# Patient Record
(demographics unavailable — no encounter records)

---

## 2020-06-10 NOTE — HP
PRIMARY CARE PHYSICIAN:  Ishaan Barlow DO



TIME OF ASSESSMENT:  1700 hours.



CHIEF COMPLAINT:  Confusion and memory loss.



HISTORY OF PRESENT ILLNESS:  Mr. Arenas is a 72-year-old gentleman who was brought

into the emergency department by his daughter due to concerns over confusion and

memory loss.  The patient apparently began to show signs of confusion at 10:00 a.m.

He apparently did not recall having a conversation with his brother earlier this

morning and did not recall several conversations.  He had noticed a family member

that he spoke to them at all.  He was not noted to have any speech changes,

extremity weakness or gait disturbances.  The patient denied any complaints

including headache, nausea, or vomiting.  He has been in his normal state of health

in recent days and has been afebrile.  The patient denies any complaints at this

present time.  He is not aware of any time bothering him or having any issues with

his memory. 



EMERGENCY DEPARTMENT COURSE:  In the emergency department, he was noted to have

stable vital signs.  He had an EKG done which demonstrated normal sinus rhythm with

no abnormalities. 



He had a CT of the head done showing mild mucosal thickening of the maxillary

sinuses and ethmoid air cells, otherwise unremarkable. 



He had a CT angiogram of the head and neck, which also showed no acute intracranial

or vascular abnormalities. 



LABORATORY STUDIES:  Done showed a white count of 6.7, hemoglobin 14.3, and

platelets 251.  He was noted to have an elevated creatinine of 1.34 with GFR 52, BUN

of 22, slightly elevated from baseline.  LFTs unremarkable.   and initial

troponin negative.  He had urinalysis done which was completely normal.  Urine drug

screen was negative as well.  The patient is being referred for further observation

and workup. 



PAST MEDICAL HISTORY:  

1. Hyperlipidemia.

2. Hypertension.



PAST SURGICAL HISTORY:  

1. Hernia repair.

2. Tonsillectomy.

3. Vasectomy.



FAMILY HISTORY:  Noncontributory.



SOCIAL HISTORY:  The patient lives with his family.  Reports rare alcohol

consumption.  Denies any tobacco use or illicit drug use. 



ALLERGIES:  NO KNOWN DRUG ALLERGIES.



CURRENT MEDICATIONS:  Atorvastatin.



PHYSICAL EXAMINATION:

GENERAL:  The patient appears well developed, well nourished, and is in no acute

distress. 

VITAL SIGNS:  Temperature 98.2, pulse 86, respirations 18, O2 saturation 96% on room

air, and blood pressure 137/108. 

HEENT:  Normocephalic and atraumatic.  Pupils are equal, round, reactive to light.

Extraocular movements intact.  Oropharynx is clear. 

NECK:  Supple. 

LUNGS:  Clear to auscultation bilaterally without any wheezes, rales, rhonchi. 

CARDIAC:  Regular rate and rhythm without audible murmurs, rubs, or gallops. 

ABDOMEN:  Soft, nontender, nondistended.  Normoactive bowel sounds present.  No

guarding or rigidity.  No renal angle tenderness. 

EXTREMITIES:  No lower leg swelling or edema. 

NEUROLOGIC:  Alert and oriented x3.  Facial movements normal.  Speech normal.  Power

5/5 in all limbs with sensation intact.  Able to follow commands.  No neuro deficits

on exam. 

SKIN:  Warm and dry.



INVESTIGATIONS:  As mentioned above in HPI.



IMPRESSION AND PLAN:  Mr. Arenas is a 72-year-old gentleman presenting with

confusion that started this morning and memory loss, who is being admitted for

management of the following. 

1. Cerebrovascular accident rule out.  Initial CT imaging of the brain unremarkable.

 The patient without any neuro deficits on exam, but still very much having

difficulty recalling events happen this morning.  We will proceed with

cerebrovascular accident workup including an MRI of the brain in the morning and

echo.  Neuro consult ordered.  We will add lipid panel for the morning, check his

TSH and also check the ammonia level.  We will add on magnesium.  Continue to

monitor overnight including neuro checks. 

2. Hypertension.  Monitor blood pressure.  Resume home medications once verified.

3. Hyperlipidemia.  We will continue statin, but we will increase to 40 mg.  He

takes 20 mg at home. 

4. Gastrointestinal prophylaxis with famotidine.

5. Deep venous thrombosis prophylaxis with mechanical SCDs.

6. Code status full.  Surrogate decision maker is his daughter, Aisha Carreno, her

phone number [phone]. 



Case discussed with attending who agrees with plan of care as described above.







Job ID:  [id number]

## 2020-06-10 NOTE — RAD
PA AND LATERAL VIEWS OF THE CHEST:

6/10/20

 

HISTORY: 

Altered mental status. 

 

COMPARISON: 

Comparison made to 8/30/16.

 

FINDINGS: 

The heart size is normal. The aorta is tortuous. Tiny calcified granuloma in the right upper lobe is 
again seen. No lobar consolidation, pneumothoraces, or pleural effusions are identified. Degenerative
 changes of the spine. 

 

IMPRESSION: 

No acute process. 

 

POS: [facility]

## 2020-06-11 NOTE — MRI
Exam: Brain MRI without contrast



HISTORY: Acute confusion, memory problems. Evaluate for CVA.



COMPARISON: None



FINDINGS: 

Calvarial marrow signal intensity: Appropriate T1 signal

Gradient echo sequence: No hemorrhage

Brain parenchyma: No mass, mass effect or midline shift. Brain volume, age-appropriate.

Cortical gray-white matter differentiation: Preserved

Restricted diffusion: Central arterial flow voids are maintained. Absent restricted diffusion

White matter signal intensities:Scattered T2, FLAIR white matter hyperintensities due to chronic smal
l vessel ischemic changes



Sinuses: Mucosal thickening of the paranasal sinuses. Adequate mastoid air cell aeration





IMPRESSION:



1. Scattered chronic small vessel ischemic changes of white matter line

2. Absent restricted diffusion. No evidence of acute infarct.



Reported By: Florencio Rodriguez 

Electronically Signed:  6/11/2020 9:33 AM

## 2020-06-11 NOTE — EEG
*******************************************************************************
********************************************************************************
*****

Referring Physician: AURORA VERDE   

 *******************************************************************************
********************************************************************************
*****

EEG #  

TEST TYPE:  CONTINUOUS EXTENDED VIDEO EEG



REPORT:



This EEG was performed using 24 channel [employer] digital video EEG machine with 24 
disc electrodes.  This was an extended 2 hour 4 minutes of video EEG recording.
  Digital analysis of the EEG was done for spike and seizure detection which 
revealed no abnormalities.



BACKGROUND:  The posterior background rhythm is 9-10 hertz.  The background 
rhythm attenuates with eye opening and enhances with eye closure.

HYPERVENTILATION:  No significant response seen with hyperventilation.

PHOTIC STIMULATION:  Not performed.

SLEEP:  Drowsiness is observed.







EEG DIAGNOSIS:



THIS IS A NORMAL AWAKE AND DROWSY EEG.





Technician: HERMINIA

: EEG.MSL
MTDD

## 2020-06-11 NOTE — CON
NEUROLOGY CONSULTATION



DATE OF CONSULTATION:  06/11/2020



REASON FOR CONSULTATION:  Altered mental status.



HISTORY OF PRESENT ILLNESS:  Mr. Arenas is a 72-year-old male with a history

significant for hypertension, hyperlipidemia, presented with an episode of 
confusion

and memory loss yesterday.  The patient is brought to the emergency department 
by

her daughter, who is also an RN.  The history is taken from the wife since the

patient does not remember most part of the day.  Yesterday, the confusion began

around 10 a.m. when he did not recall the conversation with his brother earlier 
that

morning and the wife noticed that he is confused and does not remember 
conversation

with some of the people he spoke during the day.  She called her daughter who 
is an

RN who watched him for some time and he seems to act confused and so she 
decided to

bring him to the emergency room for further evaluation.  The patient denies any

focal weakness, focal paresthesias, headache, nausea, vomiting, dizziness, 
vertigo,

loss of vision, loss of swallowing, loss of consciousness, chest pain, abdominal

pain, pain associated with the episode.  He was in normal health and denies any

recent illness.  In the emergency room, his vital signs were stable.  Head CT 
was

done, which did not reveal any acute intracranial pathology.  CT angiogram of 
the

head and neck was also unremarkable. 



REVIEW OF SYSTEMS:  All 12 systems were reviewed and were negative except the

pertinent positives and negatives mentioned in the HPI. 



PAST MEDICAL HISTORY:  Hypertension, hyperlipidemia.



PAST SURGICAL HISTORY:  Hernia repair, tonsillectomy, vasectomy.



FAMILY HISTORY:  No family history of stroke or seizures.



SOCIAL HISTORY:  The patient lives with his family, drinks alcohol occasionally.

Denies smoking or illegal drug use. 



ALLERGIES:  NO KNOWN DRUG ALLERGIES.



CURRENT MEDICATIONS:  Atorvastatin.



PHYSICAL EXAMINATION:

VITAL SIGNS:  Blood pressure 130/100, pulse 80, respiratory rate 18. 

CVS:  Regular rate and rhythm. 

CHEST:  Clear. 

ABDOMEN:  Soft. 

NECK:  No carotid bruit. 

NEUROLOGIC:  Mental status, the patient is alert and oriented to person, place, 
and

time.  Speech is normal.  Fund of knowledge is appropriate.  Recent and remote

memory intact.  Cranial nerves 2 through 12 intact.  Motor, muscle tone and 
bulk are

normal.  Strength 5/5 bilaterally.  Sensory intact to all sensory modalities.

Cerebellar, finger-nose testing intact.  Gait deferred due to patient's safety

reasons. 



LABORATORY DATA:  Data reviewed.  I reviewed the labs which were essentially

unremarkable except BUN 22 and creatinine 1.34.  The patient does admit that he 
has

been out in the sun working most of the time.  Head CT reviewed, which was

unremarkable. 



ASSESSMENT AND PLAN:  Mr. Arenas is a 72-year-old male presented with an 
episode of

confusion.  The differential diagnosis includes transient ischemic attack versus

seizure, dehydration.  He does have risk factors for stroke including 
hypertension

and hyperlipidemia MRI of the brain reviewed, which was negative for

acute intracranial process.  CT angiogram of the head and neck reviewed which 
was

negative for hemodynamically significant stenosis.  Neuro checks every 4 hours.

Start aspirin and increase dose of statin for secondary stroke prevention.  EEG

ongoing, we will follow up on the results.  Telemetry, echocardiography to rule 
out

cardioembolic source.  Continue home medications.  Continue PT/OT/Speech.  
Continue

home medications.  Continue medical management per primary team.  Further

recommendations depends on the results of the testing.  We will continue to 
follow. 



Thank you for the consult.







Job ID:  [id number]



[facility] Center[facility]ITZY

## 2020-06-11 NOTE — PRG
DATE OF SERVICE:  06/11/2020



SUBJECTIVE:  The patient is seen and examined at the bedside.  He feels like

everything is back to his baseline.  He is not confused anymore.  His wife is

present in the room during my visit and she agrees that he is back to his normal

state.  He does not have much complaints to offer. 



OBJECTIVE:  VITAL SIGNS:  Blood pressure is 152/105. 

HEENT:  His head is atraumatic and normocephalic.  Eyes are PERRLA.  Sclerae are

nonicteric.  Oral mucosa is moist. 

NECK:  Supple. 

LUNGS:  Clear. 

HEART:  S1 and S2 normal.  No S3.  No S4. 

ABDOMEN:  Soft, nontender.  Bowel sounds are present.  No organomegaly. 

EXTREMITIES:  No clubbing, cyanosis, or edema. 

NEUROLOGIC:  He is alert and oriented x4.  There is no any motor or sensory

deficits.  Cranial nerves are intact.  Visual fields within normal limits.  No

nystagmus.  No strabismus. 



LABORATORY DATA:  Showed white count of 6.7, hemoglobin 13.8, hematocrit 42.2, and

platelet count is 266,000.  Normal chemistry, normal calcium, normal creatinine.

Triglycerides 97, total cholesterol 148, LDL of 79, HDL 50.  TSH third generation is

1.7632. 



IMAGING STUDIES:  MRI of the brain showed,

1. Scattered chronic small-vessel ischemic changes of white matter line.

2. Absent restricted diffusion.  No evidence of acute infarction.



IMPRESSION AND PLAN:  

1. Episode of confusion.  Differential diagnosis includes transient ischemic attack

versus cerebrovascular accident.  The MRI did not show any organic changes.  He did

not have any cerebrovascular accident.  The patient was seen by a neurologist who

recommends further investigation with echo.  The dose of statin was increased and

the patient is on aspirin.  He will also have EEG done to rule out seizures as a

possible etiology of his confusion state and this is pending. 

2. Hypertension.  I am going to start him on losartan 50 mg one dose now and then

once a day. 

3. Hyperlipidemia.  The dose was increased on his statin to 40 mg and we will

continue deep venous thrombosis prophylaxis with sequential compression devices. 







Job ID:  [id number]

## 2020-06-12 NOTE — DIS
DATE OF ADMISSION:  06/10/2020



DATE OF DISCHARGE:  2020



DIAGNOSES AT THE TIME OF DISCHARGE:  

1. Acute encephalopathy, transient, resolved, unclear etiology.

2. Uncontrolled hypertension.

3. Diastolic dysfunction.

4. Hyperlipidemia.



CONSULTANTS:  Dr. An, Neurology Service.



TESTIN. Echocardiogram, ejection fraction estimated at 50% to 55%, impaired relaxation

compatible with diastolic dysfunction, mild mitral regurgitation, mild tricuspid

regurgitation.  No thrombus in the cardiac chamber. 

2. Chest x-ray, no acute process.

3. EEG, normal awake and drowsy EEG.

4. Brain MRI.

    a. Scattered chronic small-vessel ischemic changes in white matter line.

    b. Absent restricted diffusion.  No evidence of acute infarction.



HOSPITAL COURSE:  The patient is a 72-year-old  male, who was admitted to

the hospital after he had episode of confusion and memory loss, this happened

approximately 10 o'clock in the morning.  He did not recall having a conversation

with his brother earlier this morning.  He did not have any speech changes, extreme

weakness, or gait disturbances.  He denied any other complaints.  He never had this

kind of episode before.  The only problem in the past he had was elevated blood

pressure.  At some point of time, he was taking medications for that.  Recently, he

was only taking statin for his hyperlipidemia.  The patient got admitted through the

emergency room, where he was found to have normal vitals except for blood pressure,

which was elevated at 137/108.  Neurologically, he was alert and oriented x3.  He

had normal speech and normal strength in all limbs.  He was able to follow commands.

 There were no any neuro deficits.  The patient underwent diagnostic workup.  After

he got admitted to the hospital, he had chest x-ray, which was negative.  He had

echocardiogram, which showed some diastolic dysfunction.  He had EEG done, which

showed normal findings and his brain MRI showed chronic small-vessel ischemic

changes.  The patient was seen by neurologist, Dr. An, who recommended to

increase the dose of his atorvastatin to 40 mg once a day and start him on small

dose of baby aspirin.  In the meantime, the patient was started on losartan 50 mg

once a day since his blood pressure was elevated and his echo showed some diastolic

dysfunction, most likely related to his blood pressure issue.  The CT angiogram of

the [place] was done and it did not show any abnormalities narrowing.  He

is doing well.  His blood pressure is down to 136/93 and pulse is 76.  He is

afebrile and he shows normal physical examination.  He is discharged home in good

condition.  His medications at the time of discharge; losartan 50 mg once a day,

atorvastatin 40 mg once a day, and baby aspirin 81 mg once a day.  He is going to

start checking his blood pressure twice a day write it down and bring the

information to his primary care physician.  He is going to see his primary care

physician, Dr. Ishaan Barlow in 1 week.  He will be on activity as tolerated and heart

healthy and low-sodium diet, and we recommend him to have appointment with

cardiologist regarding his echo findings and possible follow up with neurologist in

the future. 







Job ID:  [id number]

## 2020-06-20 NOTE — EKG
Test Reason : 

Blood Pressure : ***/*** mmHG

Vent. Rate : 072 BPM     Atrial Rate : 072 BPM

   P-R Int : 176 ms          QRS Dur : 092 ms

    QT Int : 390 ms       P-R-T Axes : 014 038 034 degrees

   QTc Int : 427 ms

 

Normal sinus rhythm

Normal ECG

 

Confirmed by JANET CEDILLO DO ([id number]),  TERENCE BALLESTREOS ([id number]) on 6/20/2020 1:00:07 PM

 

Referred By:  MAMADOU           Confirmed By:JANET CEDILLO DO

## 2020-09-04 NOTE — DIS
DATE OF ADMISSION:  08/28/2020



DATE OF DISCHARGE:  08/28/2020



ADMITTING DIAGNOSES:  

1. He was admitted to undergo as an elective outpatient procedure, transesophageal

echocardiogram due to history of transient ischemic attack. 

2. Hypertension.

3. History of glomerulonephritis.

4. History of endocarditis in 2008.



DISCHARGE DIAGNOSES:  

1. He was admitted to undergo an elective outpatient procedure, transesophageal

echocardiogram due to history of transient ischemic attack. 

2. Hypertension.

3. History of glomerulonephritis.

4. History of endocarditis in 2008. 

No evidence of any intracardiac thrombi, masses, vegetations, abnormal valvular

structures, atrial septal defects, patent foramen ovale, left atrial or left atrial

appendage thrombus. 



DISCHARGE MEDICATIONS:  

1. Nexium 20 mg a day.

2. Aspirin 81 mg a day.

3. Atorvastatin 40 mg a day.

4. Losartan 50 mg once a day.



FOLLOWUP:  He will follow up with my office in the next 1 to 2 months.  He will

continue to follow up with his routine primary doctor, Dr. Ishaan Barlow. 



HOSPITAL COURSE:  A very pleasant 72-year-old gentleman, who was seen by me in the

office on August 14, 2020, after he had an episode of some altered mental status,

and was thought to have a possible TIA.  He did have a history of some PVCs in the

past with some endocarditis and rheumatic fever with hypertension, hyperlipidemia.

Echocardiogram at that time showed a normal ejection fraction with what appeared to

be diastolic dysfunction with mild mitral and tricuspid valve regurgitation.  I

believe his carotid artery evaluation was unremarkable.  He was advised to undergo a

transesophageal echocardiogram and also agitated bubble study to rule out any

evidence of possible patent foramen ovale or atrial septal defect and also to

evaluate for any possible valvular abnormalities that may have caused a small TIA as

well as for evaluation of possible left atrial or left atrial appendage thrombus.

He underwent the procedure today with no difficulties or complications.  He also had

an agitated bubble study performed at the same time and this did not show any

evidence of atrial septal defects or patent foramen ovale or any other structural

abnormalities that would account for the TIA.  He tolerated the procedure well, and

once he is awake and alert, he will be discharged to home. 







Job ID:  [id number]